# Patient Record
Sex: FEMALE | Race: WHITE | NOT HISPANIC OR LATINO | Employment: FULL TIME | URBAN - METROPOLITAN AREA
[De-identification: names, ages, dates, MRNs, and addresses within clinical notes are randomized per-mention and may not be internally consistent; named-entity substitution may affect disease eponyms.]

---

## 2020-06-08 ENCOUNTER — TELEPHONE (OUTPATIENT)
Dept: OBSTETRICS AND GYNECOLOGY | Facility: CLINIC | Age: 40
End: 2020-06-08

## 2020-06-08 DIAGNOSIS — T14.8XXA BRUISING: Primary | ICD-10-CM

## 2020-06-08 DIAGNOSIS — R23.2 HOT FLASHES: ICD-10-CM

## 2020-06-08 NOTE — TELEPHONE ENCOUNTER
Woke up Thursday AM and the inside of her amr was bruised from wrist to elbow on the front and back, some are there by her arm pits. As the day went on she noticed that she has bruises up the other arm as well. Does not remember hitting herself, one bruise on calf, foot and knee. We can do some blood work and refer if needed. She is also having hot flashes

## 2020-06-08 NOTE — TELEPHONE ENCOUNTER
----- Message from Monika Ram sent at 6/8/2020  1:05 PM CDT -----  Contact: self [phone]  States that she is calling to get orders for labs. States that she has been waking up in the mornings with unexplained bruising on her arms. States that she is also still having the night sweats and hot flashes. Please call back at [phone]//thank you acc

## 2020-06-11 ENCOUNTER — TELEPHONE (OUTPATIENT)
Dept: OBSTETRICS AND GYNECOLOGY | Facility: CLINIC | Age: 40
End: 2020-06-11

## 2020-06-11 LAB
%TRANSFERRIN SATURATION: 9 % (ref 20–50)
ABS NRBC COUNT: 0 X 10 3/UL (ref 0–0.01)
ABSOLUTE BASOPHIL: 0.01 X 10 3/UL (ref 0–0.22)
ABSOLUTE EOSINOPHIL: 0.01 X 10 3/UL (ref 0.04–0.54)
ABSOLUTE IMMATURE GRAN: 0.03 X 10 3/UL (ref 0–0.04)
ABSOLUTE LYMPHOCYTE: 1.99 X 10 3/UL (ref 0.86–4.75)
ABSOLUTE MONOCYTE: 0.41 X 10 3/UL (ref 0.22–1.08)
BASOPHILS NFR BLD: 0.1 % (ref 0.2–1.2)
E2: 177 PG/ML
EOSINOPHIL NFR BLD: 0.1 % (ref 0.7–7)
FERRITIN: 20.7 NG/ML (ref 10–232)
FSH: 3.27 MIU/ML
HCT VFR BLD AUTO: 36.4 % (ref 37–47)
HGB BLD-MCNC: 11.7 G/DL (ref 12–16)
IMMATURE GRANULOCYTES: 0.3 % (ref 0–0.5)
IRON BINDING CAPACITY: 346 UG/DL (ref 262–472)
IRON SERPL-MCNC: 31 UG/DL (ref 37–145)
LYMPHOCYTES NFR BLD: 21.7 % (ref 19.3–53.1)
MCH RBC QN AUTO: 28.1 PG (ref 27–32)
MCHC RBC AUTO-ENTMCNC: 32.1 G/DL (ref 32–36)
MCV RBC AUTO: 87.5 FL (ref 82–100)
MONOCYTES NFR BLD: 4.5 % (ref 4.7–12.5)
NEUTROPHILS ABSOLUTE COUNT: 6.72 X 10 3/UL (ref 2.15–7.56)
NEUTROPHILS NFR BLD: 73.3 %
NUCLEATED RED BLOOD CELLS: 0 /100 WBC (ref 0–0.2)
PLATELET # BLD AUTO: 348 X 10 3/UL (ref 135–400)
RBC # BLD AUTO: 4.16 X 10 6/UL (ref 4.2–5.4)
RDW-SD: 41.1 FL (ref 37–54)
UIBC SERPL-MCNC: 315 UG/DL (ref 112–306)
WBC # BLD: 9.17 X 10 3/UL (ref 4.3–10.8)

## 2020-06-11 NOTE — TELEPHONE ENCOUNTER
----- Message from Irma Cast sent at 6/11/2020 10:09 AM CDT -----  Contact: patient  Type:  Test Results    Who Called: patient  Name of Test (Lab/Mammo/Etc): Lab  Date of Test: 6/8/20  Ordering Provider: Carrie Mosqueda  Where the test was performed: [facility]  Would the patient rather a call back or a response via [facility]? Call back  Best Call Back Number: [phone]  Additional Information:  n/a

## 2020-06-11 NOTE — TELEPHONE ENCOUNTER
Called and spoke with patient. I have not yet received labs from [facility]. Pt reports she now has bumps in vaginal area and under her arm that look like clusters of ingrown hairs. She is sweating all the time. I adv pt that I will call [facility] again to have them fax over the results. Pt verbalized understanding.

## 2020-06-12 NOTE — TELEPHONE ENCOUNTER
Called and spoke with patient. Pt adv to see Dermatology per Carrie. Pt given number to Dr. Jordan. Pt wanted to know if she should continue taking iron. I adv the patient to go ahead and continue. Will discuss with Carrie when she returns to office on Tuesday if there is anything else she is wanting to do. Pt verbalized understanding.   Deysi Gracia

## 2020-06-17 ENCOUNTER — TELEPHONE (OUTPATIENT)
Dept: OBSTETRICS AND GYNECOLOGY | Facility: CLINIC | Age: 40
End: 2020-06-17

## 2020-06-17 NOTE — TELEPHONE ENCOUNTER
Called and spoke with patient. She will call her pcp regarding her labs. She had lost the number to the Dermatologist. Number given and pt will call to schedule.   Deysi Gracia

## 2020-06-17 NOTE — TELEPHONE ENCOUNTER
----- Message from Chantel Herrera sent at 6/17/2020 12:49 PM CDT -----  Contact: pt  Type:  Patient Returning Call    Who Called: pt   Who Left Message for Patient:nurse   Does the patient know what this is regarding?: results from labs, dermatology   Would the patient rather a call back or a response via [facility]? phone  Best Call Back Number: [phone]  Additional Information:

## 2020-06-17 NOTE — TELEPHONE ENCOUNTER
Called patient in regards to her labs. Left message. Pt adv and recommended to see her PCP or someone for her CBC with low iron. Also was checking to see if she was able to get in with Dermatology for evaluation.   Deysi Gracia

## 2020-12-22 ENCOUNTER — OFFICE VISIT (OUTPATIENT)
Dept: OBSTETRICS AND GYNECOLOGY | Facility: CLINIC | Age: 40
End: 2020-12-22
Payer: COMMERCIAL

## 2020-12-22 VITALS
HEIGHT: 63 IN | DIASTOLIC BLOOD PRESSURE: 88 MMHG | SYSTOLIC BLOOD PRESSURE: 132 MMHG | BODY MASS INDEX: 31.71 KG/M2 | WEIGHT: 179 LBS

## 2020-12-22 DIAGNOSIS — Z01.419 GYNECOLOGIC EXAM NORMAL: Primary | ICD-10-CM

## 2020-12-22 DIAGNOSIS — Z12.31 ENCOUNTER FOR SCREENING MAMMOGRAM FOR MALIGNANT NEOPLASM OF BREAST: ICD-10-CM

## 2020-12-22 PROCEDURE — 99396 PREV VISIT EST AGE 40-64: CPT | Mod: S$GLB,,, | Performed by: NURSE PRACTITIONER

## 2020-12-22 PROCEDURE — 3008F PR BODY MASS INDEX (BMI) DOCUMENTED: ICD-10-PCS | Mod: CPTII,S$GLB,, | Performed by: NURSE PRACTITIONER

## 2020-12-22 PROCEDURE — 3008F BODY MASS INDEX DOCD: CPT | Mod: CPTII,S$GLB,, | Performed by: NURSE PRACTITIONER

## 2020-12-22 PROCEDURE — 99396 PR PREVENTIVE VISIT,EST,40-64: ICD-10-PCS | Mod: S$GLB,,, | Performed by: NURSE PRACTITIONER

## 2020-12-22 RX ORDER — CYANOCOBALAMIN 500 UG/1
SPRAY NASAL
COMMUNITY
Start: 2020-11-10 | End: 2020-12-22 | Stop reason: SDUPTHER

## 2020-12-22 RX ORDER — CYANOCOBALAMIN 500 UG/1
1 SPRAY NASAL
Qty: 1 BOTTLE | Refills: 11 | Status: SHIPPED | OUTPATIENT
Start: 2020-12-22 | End: 2021-05-20

## 2020-12-22 RX ORDER — VENLAFAXINE HYDROCHLORIDE 150 MG/1
150 CAPSULE, EXTENDED RELEASE ORAL DAILY
COMMUNITY
Start: 2020-11-10 | End: 2020-12-23

## 2020-12-22 RX ORDER — LISDEXAMFETAMINE DIMESYLATE 50 MG/1
50 CAPSULE ORAL DAILY
COMMUNITY
Start: 2020-11-11 | End: 2021-05-20 | Stop reason: SDUPTHER

## 2020-12-22 NOTE — PATIENT INSTRUCTIONS
Breast Health: Breast Self-Awareness  What is breast self-awareness?  Breast self-awareness is knowing how your breasts normally look and feel. Your breasts change as you go through different stages of your life. So its important to learn what is normal for your breasts. Breast self-awareness helps you notice any changes in your breasts right away. Report any changes to your healthcare provider.  Why is breast self-awareness important?  Many experts now say that women should focus on breast self-awareness instead of doing a breast self-examination (BSE). These experts include the American Cancer Society, the U.[place]. Preventive Services Task Force, and the American Congress of Obstetricians and Gynecologists. Some experts even advise not teaching women to do a BSE. Thats because research hasnt shown a clear benefit to doing BSEs.  Breast self-awareness is different than a BSE. Breast self-awareness isnt about following a certain method and schedule. Its about knowing what's normal for your breasts. That way you can notice even small changes right away. If you see any changes, report them to your healthcare provider.  Changes to look for  Call your healthcare provider if you find any changes in your breasts that concern you. These changes may include:  · A lump  · Nipple discharge other than breast milk, especially a bloody discharge  · Swelling  · A change in size or shape  · Skin irritation, such as redness, thickening, or dimpling of the skin  · Swollen lymph nodes in the armpit  · Nipple problems, such as pain or redness  If you find a lump  Contact your provider if you find lumpiness in one breast, feel something different in the tissue, or feel a definite lump. Sometimes lumpiness may be due to menstrual changes. But there may be reason for concern.  Your provider may want to see you right away if you have:  · Nipple discharge that is bloody  · Skin changes on your breast, such as dimpling or puckering  Its  normal to be upset if you find a lump. But its important to contact your provider right away. Remember that most breast lumps are benign. This means they are not cancer.  Date Last Reviewed: 8/10/2015  © 1628-2364 [employer]. [address] [place] [address], [place] [address]. All rights reserved. This information is not intended as a substitute for professional medical care. Always follow your healthcare professional's instructions.

## 2020-12-22 NOTE — PROGRESS NOTES
"  Subjective:       Patient ID: Monserrat Aguilar is a 40 y.o. female.    Chief Complaint:  Well Woman      History of Present Illness  HPI  Annual Exam-Postmenopausal  Patient presents for annual exam. The patient has no complaints today. The patient is sexually active. GYN screening history: last pap: was normal. The patient is not taking hormone replacement therapy. Patient denies post-menopausal vaginal bleeding.     Outpatient Medications Marked as Taking for the 20 encounter (Office Visit) with Carrie Mosqueda NP   Medication Sig Dispense Refill    NASCOBAL 500 mcg/spray [place] 1 spray by Nasal route every 7 days. 1 Bottle 11    venlafaxine (EFFEXOR-XR) 150 MG Cp24 Take 150 mg by mouth once daily.      VYVANSE 50 mg capsule Take 50 mg by mouth once daily.      [DISCONTINUED] NASCOBAL 500 mcg/spray [place] INSTILL ONE SPRAY IN ONE NOSTRIL ONCE WEEKLY       Vitals:    20 0917   BP: 132/88   Weight: 81.2 kg (179 lb)   Height: 5' 3" (1.6 m)     Past Surgical History:   Procedure Laterality Date    APPENDECTOMY      DIAGNOSTIC LAPAROSCOPY      DILATION AND CURETTAGE OF UTERUS      GASTRIC BYPASS      LAPAROSCOPIC HYSTERECTOMY      Without BSO     Past Medical History:   Diagnosis Date    Fatigue     Hot flashes     Internal thrombosed hemorrhoids     Prolapse, fallopian tube           GYN & OB History  No LMP recorded. Patient has had a hysterectomy.   Date of Last Pap: No result found    OB History    Para Term  AB Living   3 2 2   1     SAB TAB Ectopic Multiple Live Births   1              # Outcome Date GA Lbr Jalen/2nd Weight Sex Delivery Anes PTL Lv   3 Term      Vag-Spont      2 Term      Vag-Spont      1 SAB                Review of Systems  Review of Systems   Constitutional: Negative for activity change, appetite change, chills, fatigue and fever.   HENT: Negative for nasal congestion and tinnitus.    Eyes: Negative for visual disturbance.   Respiratory: Negative for " cough and shortness of breath.    Cardiovascular: Negative for chest pain and palpitations.   Gastrointestinal: Negative for abdominal pain, bloating, blood in stool, constipation, nausea and vomiting.   Endocrine: Negative for diabetes, hair loss and hot flashes.   Genitourinary: Negative for bladder incontinence, decreased libido, dysmenorrhea, dyspareunia, dysuria, flank pain, frequency, genital sores, hematuria, hot flashes, menorrhagia, menstrual problem, pelvic pain, urgency, vaginal bleeding, vaginal discharge, vaginal pain, urinary incontinence, postcoital bleeding, postmenopausal bleeding, vaginal dryness and vaginal odor.   Musculoskeletal: Negative for arthralgias, back pain, leg pain and myalgias.   Integumentary:  Negative for rash, acne, hair changes, mole/lesion, breast mass, nipple discharge, breast skin changes and breast tenderness.   Neurological: Negative for vertigo, syncope, numbness and headaches.   Hematological: Does not bruise/bleed easily.   Psychiatric/Behavioral: Negative for depression and sleep disturbance. The patient is not nervous/anxious.    Breast: Negative for asymmetry, lump, mass, mastodynia, nipple discharge, skin changes and tenderness          Objective:    Physical Exam:   Constitutional: She appears well-developed and well-nourished.    HENT:   Head: Normocephalic.   Nose: No epistaxis.    Eyes: Lids are normal.    Neck: Trachea normal and full passive range of motion without pain.    Cardiovascular: Normal rate, regular rhythm and normal heart sounds.     Pulmonary/Chest: Effort normal and breath sounds normal. Right breast exhibits no mass, no skin change, no tenderness and no swelling. Left breast exhibits no mass, no skin change, no tenderness and no swelling. Breasts are symmetrical.        Abdominal: Normal appearance.     Genitourinary:    Vagina and rectum normal.      Pelvic exam was performed with patient supine.   Uterus is absent. No erythema in the vagina.  Labial bartholins normal.Cervix exhibits absence. negative for vaginal discharge             Lymphadenopathy:     She has no cervical adenopathy.      Psychiatric: She has a normal mood and affect. Her speech is normal and behavior is normal. Judgment and thought content normal.          Assessment:        1. Gynecologic exam normal    2. Encounter for screening mammogram for malignant neoplasm of breast                Plan:      Gynecologic exam normal    Encounter for screening mammogram for malignant neoplasm of breast  -     Mammo Digital Screening Bilat w/ [facility]; Future; Expected date: 12/22/2020    Other orders  -     NASCOBAL 500 mcg/spray [place]; 1 spray by Nasal route every 7 days.  Dispense: 1 Bottle; Refill: 11      FU one year or as needed

## 2021-05-06 ENCOUNTER — TELEPHONE (OUTPATIENT)
Dept: OBSTETRICS AND GYNECOLOGY | Facility: CLINIC | Age: 41
End: 2021-05-06

## 2021-05-17 ENCOUNTER — TELEPHONE (OUTPATIENT)
Dept: OBSTETRICS AND GYNECOLOGY | Facility: CLINIC | Age: 41
End: 2021-05-17

## 2021-05-20 ENCOUNTER — OFFICE VISIT (OUTPATIENT)
Dept: OBSTETRICS AND GYNECOLOGY | Facility: CLINIC | Age: 41
End: 2021-05-20
Payer: COMMERCIAL

## 2021-05-20 VITALS
HEART RATE: 87 BPM | DIASTOLIC BLOOD PRESSURE: 88 MMHG | WEIGHT: 179 LBS | HEIGHT: 63 IN | SYSTOLIC BLOOD PRESSURE: 153 MMHG | BODY MASS INDEX: 31.71 KG/M2

## 2021-05-20 DIAGNOSIS — F98.8 ATTENTION DEFICIT DISORDER, UNSPECIFIED HYPERACTIVITY PRESENCE: Primary | ICD-10-CM

## 2021-05-20 PROCEDURE — 99213 PR OFFICE/OUTPT VISIT, EST, LEVL III, 20-29 MIN: ICD-10-PCS | Mod: S$GLB,,, | Performed by: NURSE PRACTITIONER

## 2021-05-20 PROCEDURE — 3008F PR BODY MASS INDEX (BMI) DOCUMENTED: ICD-10-PCS | Mod: CPTII,S$GLB,, | Performed by: NURSE PRACTITIONER

## 2021-05-20 PROCEDURE — 3008F BODY MASS INDEX DOCD: CPT | Mod: CPTII,S$GLB,, | Performed by: NURSE PRACTITIONER

## 2021-05-20 PROCEDURE — 99213 OFFICE O/P EST LOW 20 MIN: CPT | Mod: S$GLB,,, | Performed by: NURSE PRACTITIONER

## 2021-05-20 RX ORDER — CYANOCOBALAMIN 1000 UG/ML
INJECTION, SOLUTION INTRAMUSCULAR; SUBCUTANEOUS
COMMUNITY
Start: 2021-03-13

## 2021-05-20 RX ORDER — LISDEXAMFETAMINE DIMESYLATE 50 MG/1
50 CAPSULE ORAL DAILY
Qty: 30 CAPSULE | Refills: 0 | Status: SHIPPED | OUTPATIENT
Start: 2021-05-20

## 2021-05-20 RX ORDER — ALBUTEROL SULFATE 90 UG/1
AEROSOL, METERED RESPIRATORY (INHALATION)
COMMUNITY
Start: 2021-03-13